# Patient Record
Sex: FEMALE | Race: WHITE | NOT HISPANIC OR LATINO | URBAN - METROPOLITAN AREA
[De-identification: names, ages, dates, MRNs, and addresses within clinical notes are randomized per-mention and may not be internally consistent; named-entity substitution may affect disease eponyms.]

---

## 2022-04-29 ENCOUNTER — ESTABLISHED PATIENT (OUTPATIENT)
Dept: URBAN - METROPOLITAN AREA CLINIC 10 | Facility: CLINIC | Age: 62
End: 2022-04-29

## 2022-04-29 DIAGNOSIS — H52.223: ICD-10-CM

## 2022-04-29 DIAGNOSIS — H43.311: ICD-10-CM

## 2022-04-29 PROCEDURE — 92250 FUNDUS PHOTOGRAPHY W/I&R: CPT

## 2022-04-29 PROCEDURE — 92015 DETERMINE REFRACTIVE STATE: CPT

## 2022-04-29 PROCEDURE — 92014 COMPRE OPH EXAM EST PT 1/>: CPT

## 2022-04-29 ASSESSMENT — VISUAL ACUITY
OD_SC: 20/60-1
OU_SC: 20/60
OS_SC: 20/60-1

## 2022-04-29 ASSESSMENT — KERATOMETRY
OD_K2POWER_DIOPTERS: 45.50
OD_K1POWER_DIOPTERS: 44.75
OD_AXISANGLE2_DEGREES: 15
OS_AXISANGLE_DEGREES: 65
OS_K1POWER_DIOPTERS: 44.75
OS_AXISANGLE2_DEGREES: 155
OS_K2POWER_DIOPTERS: 45.75
OD_AXISANGLE_DEGREES: 105

## 2022-04-29 ASSESSMENT — TONOMETRY
OD_IOP_MMHG: 12
OS_IOP_MMHG: 12

## 2022-06-24 RX ORDER — AZELASTINE 1 MG/ML
SPRAY, METERED NASAL
COMMUNITY
Start: 2021-09-14

## 2022-06-24 RX ORDER — GUAIFENESIN AND PSEUDOEPHEDRINE HCL 1200; 120 MG/1; MG/1
TABLET, EXTENDED RELEASE ORAL
COMMUNITY
Start: 2021-09-14

## 2022-10-28 PROBLEM — S62.523A CLOSED FRACTURE OF DISTAL PHALANX OF THUMB: Status: ACTIVE | Noted: 2022-10-28

## 2022-10-28 PROBLEM — T14.8XXA SPRAIN AND STRAIN: Status: ACTIVE | Noted: 2022-10-07

## 2022-10-28 PROBLEM — Z96.89 NEUROPACEMAKER STATUS: Status: ACTIVE | Noted: 2020-02-27

## 2022-10-28 PROBLEM — R26.9 GAIT ABNORMALITY: Status: ACTIVE | Noted: 2022-10-28

## 2022-10-28 PROBLEM — R04.9: Status: ACTIVE | Noted: 2022-10-28

## 2022-10-28 PROBLEM — G40.909 SEIZURE DISORDER (HCC): Status: ACTIVE | Noted: 2022-10-28

## 2022-10-28 PROBLEM — M21.6X1 OTHER ACQUIRED DEFORMITIES OF RIGHT FOOT: Status: ACTIVE | Noted: 2022-10-28

## 2022-10-28 PROBLEM — M79.644 PAIN OF RIGHT THUMB: Status: ACTIVE | Noted: 2022-10-07

## 2022-10-28 PROBLEM — H93.13 TINNITUS OF BOTH EARS: Status: ACTIVE | Noted: 2022-10-28

## 2022-10-28 PROBLEM — Q82.8 KERATODERMA: Status: ACTIVE | Noted: 2022-10-28

## 2022-10-28 PROBLEM — M20.11 ACQUIRED HALLUX VALGUS OF RIGHT FOOT: Status: ACTIVE | Noted: 2022-10-28

## 2022-10-28 PROBLEM — J40 BRONCHITIS: Status: ACTIVE | Noted: 2022-10-28

## 2022-10-28 PROBLEM — J30.9 ALLERGIC RHINITIS: Status: ACTIVE | Noted: 2022-10-28

## 2022-10-28 PROBLEM — M20.40 HAMMER TOE: Status: ACTIVE | Noted: 2022-10-28

## 2022-10-28 PROBLEM — M77.9 CAPSULITIS: Status: ACTIVE | Noted: 2022-10-28

## 2022-10-28 PROBLEM — M79.646 PAIN OF FINGER: Status: ACTIVE | Noted: 2022-10-28

## 2022-10-28 PROBLEM — C44.41 BASAL CELL CARCINOMA (BCC) OF SCALP: Status: ACTIVE | Noted: 2022-10-28

## 2022-10-28 PROBLEM — T81.49XA WOUND INFECTION AFTER SURGERY: Status: ACTIVE | Noted: 2021-11-08

## 2022-10-28 PROBLEM — G57.61 MORTON'S NEUROMA OF RIGHT FOOT: Status: ACTIVE | Noted: 2022-10-28

## 2024-08-14 NOTE — PATIENT DISCUSSION
Cataract symptoms i.e., glare, blur discussed. Pt to call if worsening vision or trouble with driving, TV, reading, ADL. UV precautions. Reviewed possibility of future cataract surgery.
Contact lens Rx given - update.
Discussed condition and exacerbating conditions/situations (e.g., dry/arid environments, overhead fans, air conditioners, side effect of medications).
Discussed importance of compliance with ocular medications and follow up exams to prevent loss of vision.
Discussed lid hygiene, warm compress and eyelid wash.
Discussed proper CL wear time.
Glasses Prescription given to patient.
Insertion and removal of CL’s taught.
Monitor cataract for progression.
Monitor.
No Retinal holes, Tears or Detachments.
Patient made aware of 24/7 emergency services.
Patient understands condition, prognosis and need for follow up care.
Recommended artificial tears to use: 1 drop 4x a day in both eyes.
Retinal tear and detachment warning symptoms reviewed and patient instructed to call immediately if increasing floaters, flashes, or decreasing peripheral vision.
Smoking cessation discussed.
The IOP is in the target range.
Trial lenses dispensed.
patient